# Patient Record
Sex: MALE | Race: WHITE | NOT HISPANIC OR LATINO | Employment: FULL TIME | URBAN - METROPOLITAN AREA
[De-identification: names, ages, dates, MRNs, and addresses within clinical notes are randomized per-mention and may not be internally consistent; named-entity substitution may affect disease eponyms.]

---

## 2018-01-04 ENCOUNTER — OFFICE VISIT - HEALTHEAST (OUTPATIENT)
Dept: FAMILY MEDICINE | Facility: CLINIC | Age: 38
End: 2018-01-04

## 2018-01-04 DIAGNOSIS — R10.30 ABDOMINAL PAIN, LOWER: ICD-10-CM

## 2018-01-04 LAB
BASOPHILS # BLD AUTO: 0.4 THOU/UL (ref 0–0.2)
BASOPHILS NFR BLD AUTO: 3 % (ref 0–2)
EOSINOPHIL # BLD AUTO: 0.5 THOU/UL (ref 0–0.4)
EOSINOPHIL NFR BLD AUTO: 3 % (ref 0–6)
ERYTHROCYTE [DISTWIDTH] IN BLOOD BY AUTOMATED COUNT: 11.4 % (ref 11–14.5)
ERYTHROCYTE [DISTWIDTH] IN BLOOD BY AUTOMATED COUNT: NORMAL % (ref 11–14.5)
HCT VFR BLD AUTO: 48.2 % (ref 40–54)
HCT VFR BLD AUTO: NORMAL % (ref 40–54)
HGB BLD-MCNC: 16.2 G/DL (ref 14–18)
HGB BLD-MCNC: NORMAL G/DL (ref 14–18)
LYMPHOCYTES # BLD AUTO: 2.4 THOU/UL (ref 0.8–4.4)
LYMPHOCYTES NFR BLD AUTO: 17 % (ref 20–40)
MCH RBC QN AUTO: 30.3 PG (ref 27–34)
MCH RBC QN AUTO: NORMAL PG (ref 27–34)
MCHC RBC AUTO-ENTMCNC: 33.7 G/DL (ref 32–36)
MCHC RBC AUTO-ENTMCNC: NORMAL G/DL (ref 32–36)
MCV RBC AUTO: 90 FL (ref 80–100)
MCV RBC AUTO: NORMAL FL (ref 80–100)
MONOCYTES # BLD AUTO: 0.9 THOU/UL (ref 0–0.9)
MONOCYTES NFR BLD AUTO: 6 % (ref 2–10)
NEUTROPHILS # BLD AUTO: 9.9 THOU/UL (ref 2–7.7)
NEUTROPHILS NFR BLD AUTO: 71 % (ref 50–70)
PLATELET # BLD AUTO: 211 THOU/UL (ref 140–440)
PLATELET # BLD AUTO: NORMAL THOU/UL (ref 140–440)
PMV BLD AUTO: 9 FL (ref 7–10)
PMV BLD AUTO: NORMAL FL (ref 7–10)
RBC # BLD AUTO: 5.36 MILL/UL (ref 4.4–6.2)
RBC # BLD AUTO: NORMAL MILL/UL (ref 4.4–6.2)
WBC: 14 THOU/UL (ref 4–11)
WBC: NORMAL THOU/UL (ref 4–11)

## 2019-02-20 ENCOUNTER — HOSPITAL ENCOUNTER (OUTPATIENT)
Dept: ULTRASOUND IMAGING | Facility: HOSPITAL | Age: 39
Discharge: HOME | End: 2019-02-20
Attending: FAMILY MEDICINE

## 2019-02-20 DIAGNOSIS — M79.89 PAIN AND SWELLING OF LOWER EXTREMITY, RIGHT: ICD-10-CM

## 2019-02-20 DIAGNOSIS — M79.604 PAIN AND SWELLING OF LOWER EXTREMITY, RIGHT: ICD-10-CM

## 2019-04-22 ENCOUNTER — RECORDS - HEALTHEAST (OUTPATIENT)
Dept: LAB | Facility: CLINIC | Age: 39
End: 2019-04-22

## 2019-04-22 LAB
ALBUMIN SERPL-MCNC: 3.9 G/DL (ref 3.5–5)
ALP SERPL-CCNC: 68 U/L (ref 45–120)
ALT SERPL W P-5'-P-CCNC: 25 U/L (ref 0–45)
ANION GAP SERPL CALCULATED.3IONS-SCNC: 9 MMOL/L (ref 5–18)
AST SERPL W P-5'-P-CCNC: 18 U/L (ref 0–40)
BILIRUB SERPL-MCNC: 0.4 MG/DL (ref 0–1)
BUN SERPL-MCNC: 15 MG/DL (ref 8–22)
CALCIUM SERPL-MCNC: 9.3 MG/DL (ref 8.5–10.5)
CHLORIDE BLD-SCNC: 104 MMOL/L (ref 98–107)
CHOLEST SERPL-MCNC: 171 MG/DL
CO2 SERPL-SCNC: 27 MMOL/L (ref 22–31)
CREAT SERPL-MCNC: 0.87 MG/DL (ref 0.7–1.3)
FASTING STATUS PATIENT QL REPORTED: NORMAL
GFR SERPL CREATININE-BSD FRML MDRD: >60 ML/MIN/1.73M2
GLUCOSE BLD-MCNC: 98 MG/DL (ref 70–125)
HDLC SERPL-MCNC: 49 MG/DL
LDLC SERPL CALC-MCNC: 103 MG/DL
POTASSIUM BLD-SCNC: 4.4 MMOL/L (ref 3.5–5)
PROT SERPL-MCNC: 6.9 G/DL (ref 6–8)
SODIUM SERPL-SCNC: 140 MMOL/L (ref 136–145)
TRIGL SERPL-MCNC: 93 MG/DL

## 2020-06-22 ENCOUNTER — RECORDS - HEALTHEAST (OUTPATIENT)
Dept: LAB | Facility: CLINIC | Age: 40
End: 2020-06-22

## 2020-06-22 LAB
ERYTHROCYTE [DISTWIDTH] IN BLOOD BY AUTOMATED COUNT: 12.4 % (ref 11–14.5)
HCT VFR BLD AUTO: 46.1 % (ref 40–54)
HGB BLD-MCNC: 14.9 G/DL (ref 14–18)
MCH RBC QN AUTO: 29 PG (ref 27–34)
MCHC RBC AUTO-ENTMCNC: 32.3 G/DL (ref 32–36)
MCV RBC AUTO: 90 FL (ref 80–100)
PLATELET # BLD AUTO: 221 THOU/UL (ref 140–440)
PMV BLD AUTO: 11 FL (ref 8.5–12.5)
RBC # BLD AUTO: 5.13 MILL/UL (ref 4.4–6.2)
WBC: 5.6 THOU/UL (ref 4–11)

## 2021-05-31 VITALS — WEIGHT: 290.4 LBS

## 2021-06-15 NOTE — PROGRESS NOTES
Chief Complaint   Patient presents with     Abdominal Pain     830 am this morning sharp pain,         HPI     Al Alfred is a 37 y.o. male seen today for sharp abdominal pain directly below his umbilicus, beginning at 8:30AM.  Initially abdomen was tender, unable to lift his 5 month son without pain. He had to lie curled up on the couch for about 2 hours before he felt like he could get up. The pain would increase and decrease on a regular 5 - 10 minute cycle. After 2 hours, pain became more of a mild ache across the same mid-abdomen area.  No pain with walking.  No pain with hitting bumps in the car on the drive to the clinic.  Denies any constipation, diarrhea, fever, chills. No change in bowel habits.    No current outpatient prescriptions on file.     No current facility-administered medications for this visit.         Reviewed and updated: medical history, medications and allergies.     Review of Systems     General: Denies fever, chills, fatigue.  Cardiovascular: Denies chest pain, dyspnea on exertion, palpitations.  Respiratory: Denies dyspnea, cough, wheezing.  GI: Denies nausea, vomiting, diarrhea, constipation.  : Denies dysuria, polyuria.     Objective     Vitals:    01/04/18 1311   BP: 112/70   Pulse: 83   Temp: 97.9  F (36.6  C)   TempSrc: Oral   SpO2: 98%   Weight: (!) 290 lb 6.4 oz (131.7 kg)        Reviewed vital signs.  General: Appears calm, comfortable. Answers questions quickly and appropriately with clear speech. No apparent distress.  Skin: Pink, warm, dry.  HENT: Normocephalic, atraumatic. TMs clear bilaterally. No lymphadenopathy.  Neck: Supple, without lymphadenopathy or thyromegaly.  Heart: Regular rate and rhythm, clear S1/S2 without murmur, rub, or gallop.  Lungs: Clear and equal bilaterally. Normal respiratory effort.  Abdomen: Diffuse tenderness below umbilicus.  Abdomen is soft, flat, without masses. No hernias palpated at rest or while patient actively lifting head and  shoulders off bed. No rashes or lesions. No splenomegaly or hepatomegaly. Negative Rovsing's, obturator and psoas signs.  Neuro: Memory and cognition appear normal. Normal gait.  Psych: Mood and affect appear normal.    Results for orders placed or performed in visit on 01/04/18   HM1 (CBC with Diff)   Result Value Ref Range    WBC 14.0 (H) 4.0 - 11.0 thou/uL    RBC 5.36 4.40 - 6.20 mill/uL    Hemoglobin 16.2 14.0 - 18.0 g/dL    Hematocrit 48.2 40.0 - 54.0 %    MCV 90 80 - 100 fL    MCH 30.3 27.0 - 34.0 pg    MCHC 33.7 32.0 - 36.0 g/dL    RDW 11.4 11.0 - 14.5 %    Platelets 211 140 - 440 thou/uL    MPV 9.0 7.0 - 10.0 fL    Neutrophils % 71 (H) 50 - 70 %    Lymphocytes % 17 (L) 20 - 40 %    Monocytes % 6 2 - 10 %    Eosinophils % 3 0 - 6 %    Basophils % 3 (H) 0 - 2 %    Neutrophils Absolute 9.9 (H) 2.0 - 7.7 thou/uL    Lymphocytes Absolute 2.4 0.8 - 4.4 thou/uL    Monocytes Absolute 0.9 0.0 - 0.9 thou/uL    Eosinophils Absolute 0.5 (H) 0.0 - 0.4 thou/uL    Basophils Absolute 0.4 (H) 0.0 - 0.2 thou/uL        Medical Decision-Making     This otherwise healthy 37-year-old male presents following an episode of severe abdominal pain lasting about 2 hours this morning.  Pain was sharp, stabbing in the lower midabdomen.  Pain diminished significantly after about 2 hours, now feels like a mild ache just an inch or 2 below the umbilicus across the midline.  He is not febrile, denies nausea or vomiting.  Physical exam does not show any indication of peritonitis and is not consistent with appendicitis, cholecystitis, gastritis, or pancreatitis.  Discussed options with Kris including an immediate trip to the ER if he is concerned versus a more conservative wait and see approach where he goes home and goes to the ER if he develops a fever, vomiting, or the sharp abdominal pain returns.  Advised him that without imaging we could not definitively rule out significant intra-abdominal pathology but that his presentation today  is not immediately concerning.  He elected to go with the conservative management and expressed clear understanding of the guidelines for when he should go to the ER.     Assessment and Plan     Al was seen today for abdominal pain.    Diagnoses and all orders for this visit:    Abdominal pain, lower  -     HM2(CBC w/o Differential)  -     HM1 (CBC and Differential)  -     HM1 (CBC with Diff)  -     Cancel: Manual Differential        Patient Instructions   Please go to the emergency department if you notice any of the following:    Fever / chills.    Return of the sharp pain you noticed this morning.    Vomiting or diarrhea.    If the mild pain you have now does not resolve completely within 24 hours, see your regular doctor.        Discussed benefit vs risk of medications, dosing, side effects.  Patient was able to verbalize understanding.  After visit summary was provided for patient.     Familia Mcdaniel PA-C

## 2023-05-23 ENCOUNTER — OFFICE VISIT (OUTPATIENT)
Dept: FAMILY MEDICINE | Facility: CLINIC | Age: 43
End: 2023-05-23
Payer: COMMERCIAL

## 2023-05-23 VITALS
RESPIRATION RATE: 17 BRPM | SYSTOLIC BLOOD PRESSURE: 124 MMHG | DIASTOLIC BLOOD PRESSURE: 83 MMHG | TEMPERATURE: 97.8 F | OXYGEN SATURATION: 98 % | WEIGHT: 296 LBS | HEART RATE: 68 BPM

## 2023-05-23 DIAGNOSIS — J06.9 VIRAL UPPER RESPIRATORY TRACT INFECTION WITH COUGH: Primary | ICD-10-CM

## 2023-05-23 LAB
DEPRECATED S PYO AG THROAT QL EIA: NEGATIVE
GROUP A STREP BY PCR: NOT DETECTED

## 2023-05-23 PROCEDURE — 99203 OFFICE O/P NEW LOW 30 MIN: CPT | Performed by: PHYSICIAN ASSISTANT

## 2023-05-23 PROCEDURE — 87651 STREP A DNA AMP PROBE: CPT | Performed by: PHYSICIAN ASSISTANT

## 2023-05-23 PROCEDURE — 87635 SARS-COV-2 COVID-19 AMP PRB: CPT | Performed by: PHYSICIAN ASSISTANT

## 2023-05-23 NOTE — PROGRESS NOTES
URGENT CARE VISIT:    SUBJECTIVE:   Al Alfred is a 42 year old male presenting with a chief complaint of runny nose, cough - productive, sore throat and headache.  Onset was 1 day(s) ago.   He denies the following symptoms: fever and shortness of breath  Course of illness is same.    Treatment measures tried include None tried with no relief of symptoms.  Predisposing factors include sick child.    PMH: History reviewed. No pertinent past medical history.  Allergies: Patient has no known allergies.   Medications:   No current outpatient medications on file.     Social History:   Social History     Tobacco Use     Smoking status: Light Smoker     Smokeless tobacco: Never   Vaping Use     Vaping status: Not on file   Substance Use Topics     Alcohol use: Not on file       ROS:  Review of systems negative except as stated above.    OBJECTIVE:  /83 (BP Location: Right arm, Patient Position: Sitting, Cuff Size: Adult Large)   Pulse 68   Temp 97.8  F (36.6  C) (Oral)   Resp 17   Wt 134.3 kg (296 lb)   SpO2 98%   GENERAL APPEARANCE: healthy, alert and no distress  EYES: EOMI,  PERRL, conjunctiva clear  HENT: ear canals and TM's normal.  Nose and mouth without ulcers, erythema or lesions  NECK: supple, nontender, no lymphadenopathy  RESP: lungs clear to auscultation - no rales, rhonchi or wheezes  CV: regular rates and rhythm, normal S1 S2, no murmur noted  SKIN: no suspicious lesions or rashes    Labs:    Results for orders placed or performed in visit on 05/23/23   Streptococcus A Rapid Screen w/Reflex to PCR     Status: Normal    Specimen: Throat; Swab   Result Value Ref Range    Group A Strep antigen Negative Negative       ASSESSMENT:    ICD-10-CM    1. Viral upper respiratory tract infection with cough  J06.9 Streptococcus A Rapid Screen w/Reflex to PCR     Symptomatic COVID-19 Virus (Coronavirus) by PCR Nose     Group A Streptococcus PCR Throat Swab          PLAN:  Patient Instructions   Patient was  educated on the natural course of viral illness which typically lasts about a week. COVID and strep PCR are pending. Conservative measures discussed including increased fluids, nasal saline irrigation (neti pot), warm steamy shower, salt water gargles, cough suppressants, expectorants (Mucinex), Cepacol lozenges, and analgesics (Tylenol and/or Ibuprofen). See your primary care provider if symptoms worsen or do not improve in 7 days. Seek emergency care if you develop fever over 104 or shortness of breath.    Patient verbalized understanding and is agreeable to plan. The patient was discharged ambulatory and in stable condition.    Roxi Chun PA-C ....................  5/23/2023   11:08 AM

## 2023-05-23 NOTE — PATIENT INSTRUCTIONS
Patient was educated on the natural course of viral illness which typically lasts about a week. COVID and strep PCR are pending. Conservative measures discussed including increased fluids, nasal saline irrigation (neti pot), warm steamy shower, salt water gargles, cough suppressants, expectorants (Mucinex), Cepacol lozenges, and analgesics (Tylenol and/or Ibuprofen). See your primary care provider if symptoms worsen or do not improve in 7 days. Seek emergency care if you develop fever over 104 or shortness of breath.

## 2023-05-24 LAB — SARS-COV-2 RNA RESP QL NAA+PROBE: NEGATIVE
